# Patient Record
Sex: FEMALE | Race: WHITE | Employment: FULL TIME | URBAN - METROPOLITAN AREA
[De-identification: names, ages, dates, MRNs, and addresses within clinical notes are randomized per-mention and may not be internally consistent; named-entity substitution may affect disease eponyms.]

---

## 2018-01-05 ENCOUNTER — PATIENT OUTREACH (OUTPATIENT)
Dept: OTHER | Age: 48
End: 2018-01-05
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
## 2018-01-08 ENCOUNTER — PATIENT OUTREACH (OUTPATIENT)
Dept: OTHER | Age: 48
End: 2018-01-08

## 2018-02-05 ENCOUNTER — PATIENT OUTREACH (OUTPATIENT)
Dept: OTHER | Age: 48
End: 2018-02-05

## 2018-02-05 NOTE — LETTER
2/5/2018 4:57 PM 
 
Ms. Liv Kumar 
[address] [address] Dear Ms. Jose, My name is Osmany Raman , Employee Care Manager for [employer], and I have been trying to reach you. The Employee Care Management program is a free-of-charge, confidential service provided to our employees and their family members covered by the [employer]. Part of my job is to follow up with members who have recently been in the hospital or emergency room, to help them coordinate their care and answer questions they may have about their visit. As healthcare providers, we know that patients do better when they have close follow up with a primary care provider (PCP), especially after a hospital or emergency department visit. If you do not have a PCP, I can help you find one that is convenient to you and covered by your insurance. I can also help you understand any after visit instructions, such as what symptoms to watch out for, or any new or changed medications. Remember that you can access your After Visit Summary by logging into your [employer] account. If you do not have a [employer] account, I can help you request access. Our program is designed to provide you with the opportunity to have a [employer] care manager partner with you for your healthcare needs. [employer] now partners with [facility]. If you are a qualifying employee, you may receive an additional 10 wellness incentive points for every month of active participation with an Employee Care Manager. Please contact me at the below number if I can provide you with assistance for any of the above services.  
 
 
 
 
 
Sincerely, 
 
 
 
 
KATELYN More RN, [place] [address], [address] [address].  
Phone:  [phone]     Fax:  [phone]    [email or web address]

## 2018-02-05 NOTE — PROGRESS NOTES
YOLIE  Wrap Up  Resolving current episode (Transitions of care complete). No further ED/UC or hospital admissions within 30 days post discharge. No contact with this patient to confirm any follow up appointment. None found in Connect care. Final attempt to contact patient for transitions of care. Left discreet message on voicemail with this CM contact information. Three phone attempts and a letter sent encouraging contact with CM. Final letter will be sent to close this episode. No outreach from patient to [address] [place] [address].

## 2021-11-25 PROBLEM — D64.9 NORMOCYTIC ANEMIA: Status: ACTIVE | Noted: 2021-11-25

## 2021-11-25 PROBLEM — F17.200 NICOTINE DEPENDENCE: Status: ACTIVE | Noted: 2021-11-25

## 2021-11-25 PROBLEM — E03.9 HYPOTHYROIDISM: Status: ACTIVE | Noted: 2021-11-25

## 2021-11-25 PROBLEM — J18.9 PNEUMONIA OF RIGHT LUNG DUE TO INFECTIOUS ORGANISM: Status: ACTIVE | Noted: 2021-11-25

## 2021-11-25 PROBLEM — J90 PLEURAL EFFUSION: Status: ACTIVE | Noted: 2021-11-25

## 2021-11-25 PROBLEM — R65.10 SIRS (SYSTEMIC INFLAMMATORY RESPONSE SYNDROME) (HCC): Status: ACTIVE | Noted: 2021-11-25

## 2021-11-25 PROBLEM — R06.02 SOB (SHORTNESS OF BREATH): Status: ACTIVE | Noted: 2021-11-25

## 2022-10-25 PROBLEM — C50.411 BREAST CANCER OF UPPER-OUTER QUADRANT OF RIGHT FEMALE BREAST (HCC): Status: ACTIVE | Noted: 2022-10-25
